# Patient Record
Sex: MALE | Race: WHITE | NOT HISPANIC OR LATINO | Employment: UNEMPLOYED | URBAN - METROPOLITAN AREA
[De-identification: names, ages, dates, MRNs, and addresses within clinical notes are randomized per-mention and may not be internally consistent; named-entity substitution may affect disease eponyms.]

---

## 2020-09-29 LAB
BUN SERPL-MCNC: 13 MG/DL (ref 8.9–20.6)
CALCIUM SERPL-MCNC: 9.2 MG/DL (ref 8.4–10.2)
CHLORIDE SERPL-SCNC: 102 MMOL/L (ref 98–107)
CO2 SERPL-SCNC: 29 MMOL/L (ref 22–29)
CREAT SERPL-MCNC: 1.53 MG/DL (ref 0.73–1.18)
CREAT/UREA NIT SERPL: 8
ERYTHROCYTE [DISTWIDTH] IN BLOOD BY AUTOMATED COUNT: 15 % (ref 11.5–17)
GLUCOSE SERPL-MCNC: 108 MG/DL (ref 74–100)
HCT VFR BLD AUTO: 46.8 % (ref 42–52)
HGB BLD-MCNC: 14.8 GM/DL (ref 14–18)
MCH RBC QN AUTO: 26.8 PG (ref 27–31)
MCHC RBC AUTO-ENTMCNC: 31.6 GM/DL (ref 33–36)
MCV RBC AUTO: 84.8 FL (ref 80–94)
PLATELET # BLD AUTO: 311 X10(3)/MCL (ref 130–400)
PMV BLD AUTO: 9.3 FL (ref 9.4–12.4)
POTASSIUM SERPL-SCNC: 4.6 MMOL/L (ref 3.5–5.1)
RBC # BLD AUTO: 5.52 X10(6)/MCL (ref 4.7–6.1)
SODIUM SERPL-SCNC: 139 MMOL/L (ref 136–145)
WBC # SPEC AUTO: 10.2 X10(3)/MCL (ref 4.5–11.5)

## 2020-10-01 ENCOUNTER — HISTORICAL (OUTPATIENT)
Dept: ADMINISTRATIVE | Facility: HOSPITAL | Age: 40
End: 2020-10-01

## 2021-06-07 LAB
ALBUMIN SERPL-MCNC: 4.1 GM/DL (ref 3.5–5)
ALBUMIN/GLOB SERPL: 1.3 RATIO (ref 1.1–2)
ALP SERPL-CCNC: 72 UNIT/L (ref 40–150)
ALT SERPL-CCNC: 44 UNIT/L (ref 0–55)
AST SERPL-CCNC: 24 UNIT/L (ref 5–34)
BILIRUB SERPL-MCNC: 0.7 MG/DL
BILIRUBIN DIRECT+TOT PNL SERPL-MCNC: 0.3 MG/DL (ref 0–0.5)
BILIRUBIN DIRECT+TOT PNL SERPL-MCNC: 0.4 MG/DL (ref 0–0.8)
BUN SERPL-MCNC: 21.5 MG/DL (ref 8.9–20.6)
CALCIUM SERPL-MCNC: 9.5 MG/DL (ref 8.4–10.2)
CHLORIDE SERPL-SCNC: 107 MMOL/L (ref 98–107)
CO2 SERPL-SCNC: 27 MMOL/L (ref 22–29)
CREAT SERPL-MCNC: 1.47 MG/DL (ref 0.73–1.18)
ERYTHROCYTE [DISTWIDTH] IN BLOOD BY AUTOMATED COUNT: 12.6 % (ref 11.5–17)
GLOBULIN SER-MCNC: 3.1 GM/DL (ref 2.4–3.5)
GLUCOSE SERPL-MCNC: 103 MG/DL (ref 74–100)
HCT VFR BLD AUTO: 40.1 % (ref 42–52)
HGB BLD-MCNC: 13.6 GM/DL (ref 14–18)
MCH RBC QN AUTO: 29.1 PG (ref 27–31)
MCHC RBC AUTO-ENTMCNC: 33.9 GM/DL (ref 33–36)
MCV RBC AUTO: 85.9 FL (ref 80–94)
PLATELET # BLD AUTO: 247 X10(3)/MCL (ref 130–400)
PMV BLD AUTO: 10.7 FL (ref 9.4–12.4)
POTASSIUM SERPL-SCNC: 4.6 MMOL/L (ref 3.5–5.1)
PROT SERPL-MCNC: 7.2 GM/DL (ref 6.4–8.3)
RBC # BLD AUTO: 4.67 X10(6)/MCL (ref 4.7–6.1)
SARS-COV-2 RNA RESP QL NAA+PROBE: NOT DETECTED
SODIUM SERPL-SCNC: 146 MMOL/L (ref 136–145)
WBC # SPEC AUTO: 6.6 X10(3)/MCL (ref 4.5–11.5)

## 2021-06-10 ENCOUNTER — HISTORICAL (OUTPATIENT)
Dept: ADMINISTRATIVE | Facility: HOSPITAL | Age: 41
End: 2021-06-10

## 2022-04-12 ENCOUNTER — HISTORICAL (OUTPATIENT)
Dept: ADMINISTRATIVE | Facility: HOSPITAL | Age: 42
End: 2022-04-12

## 2022-04-30 VITALS
OXYGEN SATURATION: 97 % | DIASTOLIC BLOOD PRESSURE: 88 MMHG | BODY MASS INDEX: 61.84 KG/M2 | SYSTOLIC BLOOD PRESSURE: 137 MMHG | WEIGHT: 315 LBS | HEIGHT: 60 IN

## 2022-04-30 NOTE — OP NOTE
Stephen Gudino MD      Patient:   Renny Norwood            MRN: [id number]            FIN: [id number]               Age:   40 years     Sex:  Male     :  1980   Associated Diagnoses:   None   Author:   Stephen Gudino MD      Date: Lexy 10, 2021    Surgeon: Stephen Gudino MD    Assistant: SHIRLEY Lamar    Preoperative Diagnosis: Right lower quadrant incisional hernia    Postoperative Diagnosis: Same    Procedure: Laparoscopic/robotic right lower quadrant incisional hernia repair with mesh    Anesthesia: GETA    Estimated Blood Loss: 10 cc    Intra-operative Findings: Right lower quadrant incisional hernia, contents had spontaneously reduced.  Defect was closed primarily with permanent suture and then underlay mesh reinforcement was performed in the intraperitoneal position using a 10 x 5 cm Dundee mesh    Procedure in Detail: After informed consent was obtained the patient was brought to the operating room and placed in the supine position.  General endotracheal anesthesia was administered without difficulty.  The patient's abdomen was prepped and draped in sterile fashion.  After infiltration with local anesthesia, a right upper quadrant incision was made in an optical trocar was used into the peritoneal cavity under direct vision.  Additional ports were placed in the right lateral abdomen under direct vision.  The hernia defect was identified, it was approximately 4 centimeters in diameter.  The contents had spontaneously reduced and hernia sac was excised.   An 10 x 5 cm oval Dundee mesh was inserted.  It was secured circumferentially using running 20V lock permanent suture with care to avoid any folding or redundancy of the mesh.  The inner portion of the mesh was secured using 30V lock absorbable suture eliminating the dead space.  Suture lines were hemostatic.  Liposomal bupivacaine was injected around the suture line in the preperitoneal position.  The area was inspected for hemostasis which  appeared to be excellent.  Ports were removed, pneumoperitoneum was relieved, port sites were closed with absorbable suture and sterile dressings were applied.  The patient tolerated the procedure well.

## 2022-04-30 NOTE — H&P
Stephen Gudino MD      Patient:   Renny Norwood            MRN: [id number]            FIN: [id number]               Age:   40 years     Sex:  Male     :  1980   Associated Diagnoses:   None   Author:   Terese WILSON, Stephen FISHER      Health Status   The H&P was reviewed, the patient was examined, and there are no changes to the patient's condition..

## 2022-04-30 NOTE — OP NOTE
Patient:   Renny Norwood            MRN: [id number]            FIN: [id number]               Age:   39 years     Sex:  Male     :  1980   Associated Diagnoses:   None   Author:   Stephen Mendoza MD      SURGEON:  Stephen Mendoza MD    PREOPERATIVE DIAGNOSIS(ES):  wound dehiscience    POSTOPERATIVE DIAGNOSIS(ES):  same.    PROCEDURE(S)/OPERATION(S) PERFORMED:  repair of wound dehiscience  closure in multiple layer  placement of retention sutures    ANESTHESIA:  General.    FLUID:  500 mL crystalloid.    ESTIMATED BLOOD LOSS:  5 mL.    FINDINGS:  Wound dehiscience  Peritoneal layer was still intact  no signs of infection wound was healthy    SPECIMENS:  None.    DRAINS:  None.    COMPLICATIONS:  None.    Indications: Mr. Norwood is a 39 Male recently seen in the outpatient Urology  Clinic for evaluation of would problems. He was found to have a wound dehiscience and counselled about need for closure in the OR.    Procedure Details:  The patient was seen in the holding room. The risks, benefits, complications, treatment options,  and expected outcomes were discussed with the patient. The patient concurred with the  proposed plan, giving informed consent. A time out was performed prior to initiating the  procedure verifying patient identification, procedure, consent completed, side/site/marked,  correct position, and any special needs.    After the induction of anesthesia. The patient was placed in a supine position. Pt   was prepped and draped in the usual sterile fashion.    the right lower quadrant wound was inspected. The fascial layers had dehisced. The peritoneal layer was still intact.  At this point the wound was closed in layers with 1-0 looped PDS sutures.  then retention suture with bolsters were placed throught the skin to hold all layers together.    An [place] dressign was then placed over the wound.    Pt was woken up from anesthesia and brought to recovery in stable condition.

## 2023-01-08 ENCOUNTER — HOSPITAL ENCOUNTER (EMERGENCY)
Facility: HOSPITAL | Age: 43
Discharge: HOME | End: 2023-01-08
Attending: SPECIALIST
Payer: MEDICAID

## 2023-01-08 VITALS
BODY MASS INDEX: 37.1 KG/M2 | DIASTOLIC BLOOD PRESSURE: 93 MMHG | SYSTOLIC BLOOD PRESSURE: 147 MMHG | RESPIRATION RATE: 20 BRPM | HEART RATE: 109 BPM | WEIGHT: 265 LBS | OXYGEN SATURATION: 98 % | TEMPERATURE: 99 F | HEIGHT: 71 IN

## 2023-01-08 DIAGNOSIS — K08.89 PAIN, DENTAL: Primary | ICD-10-CM

## 2023-01-08 PROCEDURE — 25000003 PHARM REV CODE 250: Performed by: SPECIALIST

## 2023-01-08 PROCEDURE — 99283 EMERGENCY DEPT VISIT LOW MDM: CPT

## 2023-01-08 RX ORDER — KETOROLAC TROMETHAMINE 10 MG/1
10 TABLET, FILM COATED ORAL EVERY 6 HOURS
Qty: 15 TABLET | Refills: 0 | Status: SHIPPED | OUTPATIENT
Start: 2023-01-08 | End: 2023-01-13

## 2023-01-08 RX ORDER — KETOROLAC TROMETHAMINE 10 MG/1
10 TABLET, FILM COATED ORAL
Status: COMPLETED | OUTPATIENT
Start: 2023-01-08 | End: 2023-01-08

## 2023-01-08 RX ADMIN — KETOROLAC TROMETHAMINE 10 MG: 10 TABLET, FILM COATED ORAL at 06:01

## 2023-01-09 NOTE — ED PROVIDER NOTES
Encounter Date: 1/8/2023       History     Chief Complaint   Patient presents with    Dental Pain     Patient reports was eating duck and the steel shot in duck burnt his mouth     Patient was eating duck and he bit into it in and had a piece of steel shot that cracked his tooth; he presents with a left lower molar cracked tooth complains of pain    The history is provided by the patient.   Review of patient's allergies indicates:  No Known Allergies  No past medical history on file.  No past surgical history on file.  No family history on file.     Review of Systems   Constitutional: Negative.    HENT:  Positive for dental problem.    Respiratory: Negative.     Cardiovascular: Negative.    Gastrointestinal: Negative.    Genitourinary: Negative.    Musculoskeletal: Negative.    Neurological: Negative.      Physical Exam     Initial Vitals [01/08/23 1755]   BP Pulse Resp Temp SpO2   (!) 147/93 109 20 98.7 °F (37.1 °C) 98 %      MAP       --         Physical Exam    Nursing note and vitals reviewed.  Constitutional: He appears well-developed and well-nourished.   HENT:   Head: Normocephalic and atraumatic.   Left lower molar with cracked anterior edge   Eyes: EOM are normal. Pupils are equal, round, and reactive to light.   Neck:   Normal range of motion.  Cardiovascular:  Normal rate, regular rhythm and normal heart sounds.           Pulmonary/Chest: Breath sounds normal.   Abdominal: Abdomen is soft.   Musculoskeletal:         General: Normal range of motion.      Cervical back: Normal range of motion.     Neurological: He is alert and oriented to person, place, and time.   Skin: Skin is warm and dry.       ED Course   Procedures  Labs Reviewed - No data to display       Imaging Results    None          Medications   ketorolac tablet 10 mg (has no administration in time range)                              Clinical Impression:   Final diagnoses:  [K08.89] Pain, dental (Primary)        ED Disposition Condition     Discharge Stable          ED Prescriptions       Medication Sig Dispense Start Date End Date Auth. Provider    ketorolac (TORADOL) 10 mg tablet Take 1 tablet (10 mg total) by mouth every 6 (six) hours. for 5 days 15 tablet 1/8/2023 1/13/2023 Zak Snyder MD          Follow-up Information       Follow up With Specialties Details Why Contact Info    Dentist  Schedule an appointment as soon as possible for a visit                Zak Snyder MD  01/08/23 8437